# Patient Record
Sex: MALE
[De-identification: names, ages, dates, MRNs, and addresses within clinical notes are randomized per-mention and may not be internally consistent; named-entity substitution may affect disease eponyms.]

---

## 2018-05-17 ENCOUNTER — HOSPITAL ENCOUNTER (OUTPATIENT)
Dept: HOSPITAL 80 - FCATH | Age: 48
Discharge: HOME | End: 2018-05-17
Attending: INTERNAL MEDICINE
Payer: COMMERCIAL

## 2018-05-17 DIAGNOSIS — I48.1: Primary | ICD-10-CM

## 2018-05-17 LAB
INR PPP: 1.29 (ref 0.83–1.16)
PROTHROMBIN TIME: 16.3 SEC (ref 12–15)

## 2018-05-17 PROCEDURE — 5A2204Z RESTORATION OF CARDIAC RHYTHM, SINGLE: ICD-10-PCS | Performed by: INTERNAL MEDICINE

## 2018-05-17 NOTE — EPPROC
Electrophysiology Procedure Note: 





Procedure: CV


Indication: Pt sedated using general anesthesia. Once sedated, he underwent 

synchronized DCCV at 200J. Pt converted to SR


conclusion: Successful Cv

## 2018-05-17 NOTE — PDANEPAE
ANE History of Present Illness





here for CV for AF





ANE Past Medical History





- Cardiovascular History


Hx Hypertension: No


Hx Arrhythmias: Yes


Hx Chest Pain: No


Hx Coronary Artery / Peripheral Vascular Disease: No


Hx CHF / Valvular Disease: No


Hx Palpitations: No





- Pulmonary History


Hx COPD: No


Hx Asthma/Reactive Airway Disease: No


Hx Recent Upper Respiratory Infection: No


Hx Oxygen in Use at Home: No


Hx Sleep Apnea: No





- Neurologic History


Hx Cerebrovascular Accident: No


Hx Seizures: No


Hx Dementia: No





- Endocrine History


Hx Diabetes: No


Hypothyroid: No


Hyperthyroid: No


Obesity: no





- Renal History


Hx Renal Disorders: No





- Liver History


Hx Hepatic Disorders: No





- Neurological & Psychiatric Hx


Hx Neurological and Psychiatric Disorders: No





- Cancer History


Hx Cancer: No





- Congenital Disorder History


Hx Congenital Disorders: No





- GI History


GERD: no


Hx Gastrointestinal Disorders: No





- Chronic Pain History


Chronic Pain: No





ANE Review of Systems


Review of systems is: negative


Review of Systems: 








- Exercise capacity


Exercise capacity: >=4 METS





ANE Patient History





- Allergies


Allergies/Adverse Reactions: 








No Known Allergies Allergy (Unverified 05/17/18 12:10)


 








- Home Medications


Home medications: home medication list seen and reviewed





- NPO status


NPO Status: no food or drink >8 hours





- Anes Hx


Anes Hx: no prior problems





- Smoking Hx


Smoking Status: Never smoked


Marijuana use: No





- Alcohol Use


Alcohol Use: Occasionally





- Family Anes Hx


Family Anes Hx: none





ANE Labs/Vital Signs





- Labs


Result Diagrams: 


 05/17/18 12:30





- Vital Signs


Vital Signs: reviewed preoperatively; see RN documention for details





ANE Physical Exam





- Airway


Neck exam: FROM


Mallampati Score: Class 1


Mouth exam: normal dental/mouth exam





- Pulmonary


Pulmonary: no respiratory distress





- Cardiovascular


Cardiovascular: regular rate and rhythym





- ASA Status


ASA Status: II





ANE Anesthesia Plan


Anesthesia Plan: GA with mask

## 2018-05-17 NOTE — CPEKG
Heart Rate: 74

RR Interval: 811

QRSD Interval: 86

QT Interval: 396

QTC Interval: 440

QRS Axis: 79

T Wave Axis: 36

EKG Severity - ABNORMAL ECG -

EKG Impression: ATRIAL FIBRILLATION

Electronically Signed By: Cecilio Heller 18-May-2018 07:38:24

## 2018-05-17 NOTE — CPEKG
Heart Rate: 60

RR Interval: 1000

P-R Interval: 200

QRSD Interval: 86

QT Interval: 448

QTC Interval: 448

P Axis: 59

QRS Axis: 68

T Wave Axis: 44

EKG Severity - NORMAL ECG -

EKG Impression: SINUS RHYTHM

Electronically Signed By: Cecilio Heller 18-May-2018 07:38:14

## 2018-06-01 ENCOUNTER — HOSPITAL ENCOUNTER (OUTPATIENT)
Dept: HOSPITAL 80 - FCATH | Age: 48
Discharge: HOME | End: 2018-06-01
Attending: INTERNAL MEDICINE
Payer: COMMERCIAL

## 2018-06-01 DIAGNOSIS — I48.91: Primary | ICD-10-CM

## 2018-06-01 LAB
INR PPP: 1.26 (ref 0.83–1.16)
PROTHROMBIN TIME: 16 SEC (ref 12–15)

## 2018-06-01 PROCEDURE — 5A2204Z RESTORATION OF CARDIAC RHYTHM, SINGLE: ICD-10-PCS | Performed by: INTERNAL MEDICINE

## 2018-06-01 NOTE — CPEKG
Heart Rate: 71

RR Interval: 845

QRSD Interval: 86

QT Interval: 392

QTC Interval: 426

QRS Axis: 75

T Wave Axis: 24

EKG Severity - ABNORMAL ECG -

EKG Impression: ATRIAL FIBRILLATION, V-RATE 64-79

Electronically Signed By: Zackary Chadwick 01-Jun-2018 12:57:07

## 2018-06-01 NOTE — PDANEPAE
ANE History of Present Illness





Synchronized cardioversion








ANE Past Medical History





- Cardiovascular History


Hx Hypertension: No


Hx Arrhythmias: Yes


Hx Chest Pain: No


Hx Coronary Artery / Peripheral Vascular Disease: No


Hx CHF / Valvular Disease: No


Hx Palpitations: No


Cardiovascular History Comment: Atrial fibrillation





- Pulmonary History


Hx COPD: No


Hx Asthma/Reactive Airway Disease: No


Hx Recent Upper Respiratory Infection: No


Hx Oxygen in Use at Home: No


Hx Sleep Apnea: No





- Neurologic History


Hx Cerebrovascular Accident: No


Hx Seizures: No


Hx Dementia: No





- Endocrine History


Hx Diabetes: No


Obesity: no





- Renal History


Hx Renal Disorders: No





- Liver History


Hx Hepatic Disorders: No





- Neurological & Psychiatric Hx


Hx Neurological and Psychiatric Disorders: No





- Cancer History


Hx Cancer: No





- Congenital Disorder History


Hx Congenital Disorders: No





- GI History


GERD: no


Hx Gastrointestinal Disorders: No





- Chronic Pain History


Chronic Pain: No





- Surgical History


Prior Surgeries: knee arthroscopy





ANE Review of Systems


Review of Systems: 








- Exercise capacity


METS (RN): 4 METS





ANE Patient History





- Allergies


Allergies/Adverse Reactions: 








No Known Allergies Allergy (Verified 05/29/18 10:32)


 








- Home Medications


Home Medications: 








Apixaban [Eliquis] 5 mg PO BID 05/29/18 [Last Taken 06/01/18 06:40]


Flecainide Acetate 100 mg PO BID 05/29/18 [Last Taken 06/01/18 06:40]








- Anes Hx


Anes Hx: no prior problems





- Smoking Hx


Smoking Status: Never smoked





- Alcohol Use


Alcohol Use: Other (5 drinks/week)





- Family Anes Hx


Family Anes Hx: none





ANE Labs/Vital Signs





- Labs


Result Diagrams: 


 06/01/18 07:25





- Vital Signs


Height: 185 cm


Weight: 100.7 kg





ANE Physical Exam





- Airway


Neck exam: FROM


Mallampati Score: Class 1 (Upper front crown)





- Pulmonary


Pulmonary: clear to auscultation





- Cardiovascular


Cardiovascular: irregularly irregular





- ASA Status


ASA Status: II





ANE Anesthesia Plan


Anesthesia Plan: GA with mask

## 2018-06-01 NOTE — CPEKG
Heart Rate: 68

RR Interval: 882

P-R Interval: 212

QRSD Interval: 88

QT Interval: 424

QTC Interval: 451

P Axis: 56

QRS Axis: 71

T Wave Axis: 34

EKG Severity - ABNORMAL ECG -

EKG Impression: SINUS RHYTHM

EKG Impression: FIRST DEGREE AV BLOCK

Electronically Signed By: Zackary Chadwick 01-Jun-2018 12:57:00

## 2018-06-01 NOTE — PDCARD
Cardioversion Procedure


Procedure: 


electrical cardioversion





Indications: atrial fibrillation


Consent: signed and in chart


Anticoagulation: eliquis


Procedural Details: 


Pads were placed in anterior-posterior position.





Synchronized cardioversion attempt #1: 200J


Results: normal sinus rhythm


Conclusions: successful cardioversion

## 2024-10-10 ENCOUNTER — APPOINTMENT (RX ONLY)
Dept: URBAN - NONMETROPOLITAN AREA CLINIC 29 | Facility: CLINIC | Age: 54
Setting detail: DERMATOLOGY
End: 2024-10-10

## 2024-10-10 DIAGNOSIS — L82.1 OTHER SEBORRHEIC KERATOSIS: ICD-10-CM

## 2024-10-10 DIAGNOSIS — Z71.89 OTHER SPECIFIED COUNSELING: ICD-10-CM

## 2024-10-10 DIAGNOSIS — D22 MELANOCYTIC NEVI: ICD-10-CM

## 2024-10-10 DIAGNOSIS — L81.4 OTHER MELANIN HYPERPIGMENTATION: ICD-10-CM

## 2024-10-10 DIAGNOSIS — D18.0 HEMANGIOMA: ICD-10-CM

## 2024-10-10 DIAGNOSIS — L57.0 ACTINIC KERATOSIS: ICD-10-CM

## 2024-10-10 DIAGNOSIS — D17 BENIGN LIPOMATOUS NEOPLASM: ICD-10-CM

## 2024-10-10 DIAGNOSIS — D49.2 NEOPLASM OF UNSPECIFIED BEHAVIOR OF BONE, SOFT TISSUE, AND SKIN: ICD-10-CM

## 2024-10-10 PROBLEM — D17.21 BENIGN LIPOMATOUS NEOPLASM OF SKIN AND SUBCUTANEOUS TISSUE OF RIGHT ARM: Status: ACTIVE | Noted: 2024-10-10

## 2024-10-10 PROBLEM — D17.1 BENIGN LIPOMATOUS NEOPLASM OF SKIN AND SUBCUTANEOUS TISSUE OF TRUNK: Status: ACTIVE | Noted: 2024-10-10

## 2024-10-10 PROBLEM — D17.22 BENIGN LIPOMATOUS NEOPLASM OF SKIN AND SUBCUTANEOUS TISSUE OF LEFT ARM: Status: ACTIVE | Noted: 2024-10-10

## 2024-10-10 PROBLEM — D18.01 HEMANGIOMA OF SKIN AND SUBCUTANEOUS TISSUE: Status: ACTIVE | Noted: 2024-10-10

## 2024-10-10 PROBLEM — D22.5 MELANOCYTIC NEVI OF TRUNK: Status: ACTIVE | Noted: 2024-10-10

## 2024-10-10 PROCEDURE — ? DEFER

## 2024-10-10 PROCEDURE — 11102 TANGNTL BX SKIN SINGLE LES: CPT

## 2024-10-10 PROCEDURE — 11103 TANGNTL BX SKIN EA SEP/ADDL: CPT

## 2024-10-10 PROCEDURE — 99203 OFFICE O/P NEW LOW 30 MIN: CPT | Mod: 25

## 2024-10-10 PROCEDURE — 17003 DESTRUCT PREMALG LES 2-14: CPT

## 2024-10-10 PROCEDURE — ? LIQUID NITROGEN

## 2024-10-10 PROCEDURE — 17000 DESTRUCT PREMALG LESION: CPT | Mod: 59

## 2024-10-10 PROCEDURE — ? BIOPSY BY SHAVE METHOD

## 2024-10-10 PROCEDURE — ? SUNSCREEN RECOMMENDATIONS

## 2024-10-10 PROCEDURE — ? COUNSELING

## 2024-10-10 ASSESSMENT — LOCATION SIMPLE DESCRIPTION DERM
LOCATION SIMPLE: SUPERIOR FOREHEAD
LOCATION SIMPLE: LEFT UPPER BACK
LOCATION SIMPLE: LOWER BACK
LOCATION SIMPLE: RIGHT CHEEK
LOCATION SIMPLE: LEFT FOREHEAD
LOCATION SIMPLE: CHEST
LOCATION SIMPLE: RIGHT FOREARM
LOCATION SIMPLE: ABDOMEN
LOCATION SIMPLE: RIGHT FOREHEAD
LOCATION SIMPLE: RIGHT EYEBROW
LOCATION SIMPLE: LEFT EYEBROW
LOCATION SIMPLE: LEFT CHEEK
LOCATION SIMPLE: RIGHT PLANTAR SURFACE
LOCATION SIMPLE: LEFT FOREARM

## 2024-10-10 ASSESSMENT — LOCATION DETAILED DESCRIPTION DERM
LOCATION DETAILED: LEFT MEDIAL EYEBROW
LOCATION DETAILED: LEFT LATERAL ABDOMEN
LOCATION DETAILED: LEFT INFERIOR MEDIAL UPPER BACK
LOCATION DETAILED: RIGHT INFERIOR FOREHEAD
LOCATION DETAILED: LEFT INFERIOR FOREHEAD
LOCATION DETAILED: RIGHT LATERAL EYEBROW
LOCATION DETAILED: RIGHT CENTRAL MALAR CHEEK
LOCATION DETAILED: SUPERIOR MID FOREHEAD
LOCATION DETAILED: LEFT CENTRAL EYEBROW
LOCATION DETAILED: RIGHT CENTRAL BUCCAL CHEEK
LOCATION DETAILED: RIGHT VENTRAL PROXIMAL FOREARM
LOCATION DETAILED: RIGHT LATERAL MALAR CHEEK
LOCATION DETAILED: LEFT INFERIOR CENTRAL MALAR CHEEK
LOCATION DETAILED: RIGHT PROXIMAL DORSAL FOREARM
LOCATION DETAILED: RIGHT LATERAL ABDOMEN
LOCATION DETAILED: EPIGASTRIC SKIN
LOCATION DETAILED: LEFT LATERAL EYEBROW
LOCATION DETAILED: LEFT LATERAL MALAR CHEEK
LOCATION DETAILED: LEFT VENTRAL DISTAL FOREARM
LOCATION DETAILED: LEFT MEDIAL SUPERIOR CHEST
LOCATION DETAILED: SUPERIOR LUMBAR SPINE
LOCATION DETAILED: RIGHT INSTEP
LOCATION DETAILED: LEFT DISTAL DORSAL FOREARM
LOCATION DETAILED: PERIUMBILICAL SKIN
LOCATION DETAILED: RIGHT INFERIOR CENTRAL MALAR CHEEK
LOCATION DETAILED: LEFT PROXIMAL DORSAL FOREARM
LOCATION DETAILED: RIGHT DISTAL DORSAL FOREARM

## 2024-10-10 ASSESSMENT — LOCATION ZONE DERM
LOCATION ZONE: FEET
LOCATION ZONE: TRUNK
LOCATION ZONE: ARM
LOCATION ZONE: FACE

## 2024-10-29 ENCOUNTER — APPOINTMENT (RX ONLY)
Dept: URBAN - NONMETROPOLITAN AREA CLINIC 29 | Facility: CLINIC | Age: 54
Setting detail: DERMATOLOGY
End: 2024-10-29

## 2024-10-29 DIAGNOSIS — D22 MELANOCYTIC NEVI: ICD-10-CM

## 2024-10-29 PROBLEM — D22.71 MELANOCYTIC NEVI OF RIGHT LOWER LIMB, INCLUDING HIP: Status: ACTIVE | Noted: 2024-10-29

## 2024-10-29 PROCEDURE — 11305 SHAVE SKIN LESION 0.5 CM/<: CPT

## 2024-10-29 PROCEDURE — ? COUNSELING

## 2024-10-29 PROCEDURE — ? SHAVE REMOVAL

## 2024-10-29 ASSESSMENT — LOCATION DETAILED DESCRIPTION DERM: LOCATION DETAILED: RIGHT INSTEP

## 2024-10-29 ASSESSMENT — LOCATION ZONE DERM: LOCATION ZONE: FEET

## 2024-10-29 ASSESSMENT — LOCATION SIMPLE DESCRIPTION DERM: LOCATION SIMPLE: RIGHT PLANTAR SURFACE
